# Patient Record
Sex: MALE | Race: WHITE | Employment: OTHER | URBAN - METROPOLITAN AREA
[De-identification: names, ages, dates, MRNs, and addresses within clinical notes are randomized per-mention and may not be internally consistent; named-entity substitution may affect disease eponyms.]

---

## 2018-03-06 ENCOUNTER — HOSPITAL ENCOUNTER (OUTPATIENT)
Dept: PREADMISSION TESTING | Age: 63
Discharge: HOME | End: 2018-03-06
Payer: COMMERCIAL

## 2018-03-06 PROBLEM — M10.9 GOUT: Chronic | Status: ACTIVE | Noted: 2018-03-06

## 2018-03-06 PROBLEM — S83.242A TEAR OF MEDIAL MENISCUS OF LEFT KNEE, CURRENT: Chronic | Status: ACTIVE | Noted: 2018-03-06

## 2018-03-06 PROBLEM — K21.9 GERD (GASTROESOPHAGEAL REFLUX DISEASE): Chronic | Status: ACTIVE | Noted: 2018-03-06

## 2018-03-06 LAB
ALBUMIN SERPL-MCNC: 3.6 G/DL (ref 3.4–5)
ALBUMIN/GLOB SERPL: 0.8 {RATIO} (ref 0.8–1.7)
ALP SERPL-CCNC: 65 U/L (ref 45–117)
ALT SERPL-CCNC: 66 U/L (ref 16–61)
ANION GAP SERPL CALC-SCNC: 8 MMOL/L (ref 3–18)
AST SERPL-CCNC: 39 U/L (ref 15–37)
BILIRUB SERPL-MCNC: 0.3 MG/DL (ref 0.2–1)
BUN SERPL-MCNC: 22 MG/DL (ref 7–18)
BUN/CREAT SERPL: 18 (ref 12–20)
CALCIUM SERPL-MCNC: 9.2 MG/DL (ref 8.5–10.1)
CHLORIDE SERPL-SCNC: 107 MMOL/L (ref 100–108)
CO2 SERPL-SCNC: 26 MMOL/L (ref 21–32)
CREAT SERPL-MCNC: 1.25 MG/DL (ref 0.6–1.3)
ERYTHROCYTE [DISTWIDTH] IN BLOOD BY AUTOMATED COUNT: 12.9 % (ref 11.6–14.5)
GLOBULIN SER CALC-MCNC: 4.4 G/DL (ref 2–4)
GLUCOSE SERPL-MCNC: 112 MG/DL (ref 74–99)
HCT VFR BLD AUTO: 45.3 % (ref 36–48)
HGB BLD-MCNC: 15.2 G/DL (ref 13–16)
MCH RBC QN AUTO: 31 PG (ref 24–34)
MCHC RBC AUTO-ENTMCNC: 33.6 G/DL (ref 31–37)
MCV RBC AUTO: 92.3 FL (ref 74–97)
PLATELET # BLD AUTO: 220 K/UL (ref 135–420)
PMV BLD AUTO: 9.8 FL (ref 9.2–11.8)
POTASSIUM SERPL-SCNC: 4 MMOL/L (ref 3.5–5.5)
PROT SERPL-MCNC: 8 G/DL (ref 6.4–8.2)
RBC # BLD AUTO: 4.91 M/UL (ref 4.7–5.5)
SODIUM SERPL-SCNC: 141 MMOL/L (ref 136–145)
WBC # BLD AUTO: 7.1 K/UL (ref 4.6–13.2)

## 2018-03-06 PROCEDURE — 85027 COMPLETE CBC AUTOMATED: CPT | Performed by: ORTHOPAEDIC SURGERY

## 2018-03-06 PROCEDURE — 36415 COLL VENOUS BLD VENIPUNCTURE: CPT | Performed by: ORTHOPAEDIC SURGERY

## 2018-03-06 PROCEDURE — 80053 COMPREHEN METABOLIC PANEL: CPT | Performed by: ORTHOPAEDIC SURGERY

## 2018-03-07 ENCOUNTER — HOSPITAL ENCOUNTER (OUTPATIENT)
Dept: PREADMISSION TESTING | Age: 63
Discharge: HOME | End: 2018-03-07
Payer: COMMERCIAL

## 2018-03-07 DIAGNOSIS — Z01.818 PREOP EXAMINATION: ICD-10-CM

## 2018-03-07 PROCEDURE — 93005 ELECTROCARDIOGRAM TRACING: CPT

## 2018-03-07 RX ORDER — SODIUM CHLORIDE 0.9 % (FLUSH) 0.9 %
5-10 SYRINGE (ML) INJECTION EVERY 8 HOURS
Status: CANCELLED | OUTPATIENT
Start: 2018-03-07

## 2018-03-07 RX ORDER — SODIUM CHLORIDE 0.9 % (FLUSH) 0.9 %
5-10 SYRINGE (ML) INJECTION AS NEEDED
Status: CANCELLED | OUTPATIENT
Start: 2018-03-07

## 2018-03-07 RX ORDER — DIPHENHYDRAMINE HCL 25 MG
25 CAPSULE ORAL
Status: CANCELLED | OUTPATIENT
Start: 2018-03-07

## 2018-03-07 NOTE — H&P
History and Physical        Patient: Fanny Awan               Sex: male          DOA: (Not on file)         YOB: 1955      Age:  65 y.o.        LOS:  LOS: 0 days        HPI:      Linda Sampson is in for followup of his left knee large medial meniscal tear as seen by MRI. His knee is still painful just on the medial side and he is having continued pain. His discomfort is more posterior than anterior. The patient started getting pain in the medial aspect of his left knee with a fullness in the popliteal fossa one month ago with no known injury or event. His pain has not gotten better recently and he had a popping sensation in the knee that occurred within the last week. He has noticed some more swelling. Standing AP, tunnel, lateral, and sunrise views of the left knee were obtained and interpreted in the office  and show mild DJD. Otherwise, x-rays reveal no periosteal reaction, no medullary lesions, no osteopenia, well-aligned joint spaces, and no chondrolysis.     Past Medical History:   Diagnosis Date    Acid reflux     Hyperlipidemia     Hypertension     poorly controlled    Inappropriate sinus tachycardia 12/23/2012    Low back pain     Plantar fasciitis     Sinus tachycardia     inappropriate; palpitations due to this condition       Past Surgical History:   Procedure Laterality Date    ENDOSCOPY, COLON, DIAGNOSTIC  05/18/04    HX GI  05/12/04    upper gastrointestinal panendoscopy       Family History   Problem Relation Age of Onset    Coronary Artery Disease Father     Coronary Artery Disease Mother     Coronary Artery Disease Brother     Coronary Artery Disease       2 nephews       Social History     Social History    Marital status:      Spouse name: N/A    Number of children: N/A    Years of education: N/A     Social History Main Topics    Smoking status: Never Smoker    Smokeless tobacco: Never Used    Alcohol use No    Drug use: No    Sexual activity: Not on file     Other Topics Concern    Not on file     Social History Narrative    No narrative on file       Prior to Admission medications    Medication Sig Start Date End Date Taking? Authorizing Provider   atenolol (TENORMIN) 50 mg tablet Take 1 tablet by mouth two (2) times a day. 11/4/14   Tim Bocanegra MD   multivitamin (ONE A DAY) tablet Take 1 Tab by mouth daily. Historical Provider   omega 3-dha-epa-fish oil (FISH OIL) 900-1,400 mg CpDR Take  by mouth. Historical Provider   omeprazole (PRILOSEC) 20 mg capsule Take 20 mg by mouth as needed. Historical Provider   aspirin 81 mg tablet Take  by mouth daily. Historical Provider       No Known Allergies    Review of Systems    Pertinent positives include gout. Pertinent negatives include blurred vision, chest pain, chills, cold, discharge of the eyes, dizziness, double vision, fever, headache, hearing loss, heart murmur, itching of the eyes, palpitations, redness of the eyes, rheumatic fever, ringing in ears, sore throat/hoarseness, weight change, abdominal pain, anxiety, bipolar disorder, bladder/kidney infection, bloody stool, blood in urine, burning sensation, changes in mood, chronic cough, depression, diarrhea, difficulty breathing, difficulty swallowing, fainting, frequent urinating, fracture/dislocation, gas/bloating, hemorrhoids, incontinence, joint pain, joint stiffness, loss of balance, memory loss, muscle weakness, nausea/vomiting, numbness/tingling, pain on breathing, painful urination, psoriasis, rash/itching, Raynaud's phenomenon, rheumatoid disease, seizure disorder, shortness of breath, sprain/strain, swelling of feet, tendonitis, varicose veins and wheezing. Physical Exam:      There were no vitals taken for this visit. Physical Exam:  Physical exam shows a healthy appearing 65year old white male. The left knee is tender on the posteromedial joint line.   He has about a 20cc knee effusion and he has only mild patellofemoral crepitation. Examination of the left knee demonstrates the patient has full extension to flexion well beyond 90 degrees. The patient has a negative Lachman and has no varus or valgus instability at zero degrees or 30 degrees. There is a negative posterior sag. There is no swelling, ecchymosis, or wounds. The patient has no lateral joint line pain and no popliteal pain. The patient has a negative patellar inhibition, a negative patellar grind, and a negative patellar apprehension test.  There is a negative Manny Maneuver. There is no pain at the inferior pole of the patella or the tibial tubercle. The patient has 5/5 muscle strength with good quadriceps tone. The patient has good capillary refill with normal motor strength of the foot and ankle and normal light-touch sensation in the foot and lower leg. Assessment/Plan     Principal Problem:    Tear of medial meniscus of left knee, current (3/6/2018)    Active Problems:    GERD (gastroesophageal reflux disease) (3/6/2018)      Gout (3/6/2018)      Dr. James Miranda talked to him about a left knee arthroscopy and partial medial meniscectomy under MAC anesthesia. Dr. Hernandez President talked to the patient about the risks, the alternatives, and the benefits including infection, pain and bleeding, and he understands these and is willing to proceed. 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 Dr. James Miranda has given him Norco for postoperative pain use and Dr. James Miranda will see him again one week postop.

## 2018-03-08 ENCOUNTER — HOSPITAL ENCOUNTER (OUTPATIENT)
Age: 63
Setting detail: OUTPATIENT SURGERY
Discharge: HOME | End: 2018-03-08
Attending: ORTHOPAEDIC SURGERY | Admitting: ORTHOPAEDIC SURGERY
Payer: COMMERCIAL

## 2018-03-08 ENCOUNTER — ANESTHESIA EVENT (OUTPATIENT)
Dept: SURGERY | Age: 63
End: 2018-03-08
Payer: COMMERCIAL

## 2018-03-08 ENCOUNTER — ANESTHESIA (OUTPATIENT)
Dept: SURGERY | Age: 63
End: 2018-03-08
Payer: COMMERCIAL

## 2018-03-08 VITALS
OXYGEN SATURATION: 98 % | TEMPERATURE: 98.2 F | HEART RATE: 91 BPM | HEIGHT: 71 IN | BODY MASS INDEX: 37.81 KG/M2 | WEIGHT: 270.06 LBS | SYSTOLIC BLOOD PRESSURE: 141 MMHG | RESPIRATION RATE: 15 BRPM | DIASTOLIC BLOOD PRESSURE: 79 MMHG

## 2018-03-08 PROBLEM — S83.249A MEDIAL MENISCUS TEAR: Status: ACTIVE | Noted: 2018-03-08

## 2018-03-08 LAB
ATRIAL RATE: 77 BPM
CALCULATED P AXIS, ECG09: 38 DEGREES
CALCULATED R AXIS, ECG10: 28 DEGREES
CALCULATED T AXIS, ECG11: 26 DEGREES
DIAGNOSIS, 93000: NORMAL
P-R INTERVAL, ECG05: 176 MS
Q-T INTERVAL, ECG07: 380 MS
QRS DURATION, ECG06: 82 MS
QTC CALCULATION (BEZET), ECG08: 430 MS
VENTRICULAR RATE, ECG03: 77 BPM

## 2018-03-08 PROCEDURE — 77030006884 HC BLD SHV INCIS S&N -B: Performed by: ORTHOPAEDIC SURGERY

## 2018-03-08 PROCEDURE — 76210000063 HC OR PH I REC FIRST 0.5 HR: Performed by: ORTHOPAEDIC SURGERY

## 2018-03-08 PROCEDURE — 74011250636 HC RX REV CODE- 250/636

## 2018-03-08 PROCEDURE — 77030036563 HC WRP CLD THER KNE S2SG -B: Performed by: ORTHOPAEDIC SURGERY

## 2018-03-08 PROCEDURE — 77030020782 HC GWN BAIR PAWS FLX 3M -B: Performed by: ORTHOPAEDIC SURGERY

## 2018-03-08 PROCEDURE — 76060000032 HC ANESTHESIA 0.5 TO 1 HR: Performed by: ORTHOPAEDIC SURGERY

## 2018-03-08 PROCEDURE — 74011250636 HC RX REV CODE- 250/636: Performed by: ANESTHESIOLOGY

## 2018-03-08 PROCEDURE — 77030028224 HC PDNG CST BSNM -A: Performed by: ORTHOPAEDIC SURGERY

## 2018-03-08 PROCEDURE — 77030034478 HC TU IRR ARTHRO PT ARTH -B: Performed by: ORTHOPAEDIC SURGERY

## 2018-03-08 PROCEDURE — 77030008509 HC TBNG IN/OUT FLO LEMV -B: Performed by: ORTHOPAEDIC SURGERY

## 2018-03-08 PROCEDURE — 74011000250 HC RX REV CODE- 250: Performed by: ORTHOPAEDIC SURGERY

## 2018-03-08 PROCEDURE — 74011250636 HC RX REV CODE- 250/636: Performed by: ORTHOPAEDIC SURGERY

## 2018-03-08 PROCEDURE — 77030018835 HC SOL IRR LR ICUM -A: Performed by: ORTHOPAEDIC SURGERY

## 2018-03-08 PROCEDURE — 76210000021 HC REC RM PH II 0.5 TO 1 HR: Performed by: ORTHOPAEDIC SURGERY

## 2018-03-08 PROCEDURE — 76010000138 HC OR TIME 0.5 TO 1 HR: Performed by: ORTHOPAEDIC SURGERY

## 2018-03-08 PROCEDURE — 74011000250 HC RX REV CODE- 250

## 2018-03-08 PROCEDURE — 77030002933 HC SUT MCRYL J&J -A: Performed by: ORTHOPAEDIC SURGERY

## 2018-03-08 PROCEDURE — 74011250636 HC RX REV CODE- 250/636: Performed by: PHYSICIAN ASSISTANT

## 2018-03-08 RX ORDER — KETOROLAC TROMETHAMINE 30 MG/ML
INJECTION, SOLUTION INTRAMUSCULAR; INTRAVENOUS AS NEEDED
Status: DISCONTINUED | OUTPATIENT
Start: 2018-03-08 | End: 2018-03-08 | Stop reason: HOSPADM

## 2018-03-08 RX ORDER — DEXTROSE 50 % IN WATER (D50W) INTRAVENOUS SYRINGE
25-50 AS NEEDED
Status: DISCONTINUED | OUTPATIENT
Start: 2018-03-08 | End: 2018-03-08 | Stop reason: HOSPADM

## 2018-03-08 RX ORDER — HYDROCODONE BITARTRATE AND ACETAMINOPHEN 5; 325 MG/1; MG/1
1 TABLET ORAL AS NEEDED
Status: DISCONTINUED | OUTPATIENT
Start: 2018-03-08 | End: 2018-03-08 | Stop reason: HOSPADM

## 2018-03-08 RX ORDER — DEXAMETHASONE SODIUM PHOSPHATE 4 MG/ML
INJECTION, SOLUTION INTRA-ARTICULAR; INTRALESIONAL; INTRAMUSCULAR; INTRAVENOUS; SOFT TISSUE AS NEEDED
Status: DISCONTINUED | OUTPATIENT
Start: 2018-03-08 | End: 2018-03-08 | Stop reason: HOSPADM

## 2018-03-08 RX ORDER — DIPHENHYDRAMINE HYDROCHLORIDE 50 MG/ML
12.5 INJECTION, SOLUTION INTRAMUSCULAR; INTRAVENOUS
Status: DISCONTINUED | OUTPATIENT
Start: 2018-03-08 | End: 2018-03-08 | Stop reason: HOSPADM

## 2018-03-08 RX ORDER — MIDAZOLAM HYDROCHLORIDE 1 MG/ML
INJECTION, SOLUTION INTRAMUSCULAR; INTRAVENOUS AS NEEDED
Status: DISCONTINUED | OUTPATIENT
Start: 2018-03-08 | End: 2018-03-08 | Stop reason: HOSPADM

## 2018-03-08 RX ORDER — LIDOCAINE HYDROCHLORIDE 20 MG/ML
INJECTION, SOLUTION EPIDURAL; INFILTRATION; INTRACAUDAL; PERINEURAL AS NEEDED
Status: DISCONTINUED | OUTPATIENT
Start: 2018-03-08 | End: 2018-03-08 | Stop reason: HOSPADM

## 2018-03-08 RX ORDER — SODIUM CHLORIDE, SODIUM LACTATE, POTASSIUM CHLORIDE, CALCIUM CHLORIDE 600; 310; 30; 20 MG/100ML; MG/100ML; MG/100ML; MG/100ML
150 INJECTION, SOLUTION INTRAVENOUS CONTINUOUS
Status: DISCONTINUED | OUTPATIENT
Start: 2018-03-08 | End: 2018-03-08 | Stop reason: HOSPADM

## 2018-03-08 RX ORDER — GLYCOPYRROLATE 0.2 MG/ML
INJECTION INTRAMUSCULAR; INTRAVENOUS AS NEEDED
Status: DISCONTINUED | OUTPATIENT
Start: 2018-03-08 | End: 2018-03-08 | Stop reason: HOSPADM

## 2018-03-08 RX ORDER — ONDANSETRON 2 MG/ML
INJECTION INTRAMUSCULAR; INTRAVENOUS AS NEEDED
Status: DISCONTINUED | OUTPATIENT
Start: 2018-03-08 | End: 2018-03-08 | Stop reason: HOSPADM

## 2018-03-08 RX ORDER — SODIUM CHLORIDE, SODIUM LACTATE, POTASSIUM CHLORIDE, CALCIUM CHLORIDE 600; 310; 30; 20 MG/100ML; MG/100ML; MG/100ML; MG/100ML
125 INJECTION, SOLUTION INTRAVENOUS CONTINUOUS
Status: DISCONTINUED | OUTPATIENT
Start: 2018-03-08 | End: 2018-03-08 | Stop reason: HOSPADM

## 2018-03-08 RX ORDER — MAGNESIUM SULFATE 100 %
4 CRYSTALS MISCELLANEOUS AS NEEDED
Status: DISCONTINUED | OUTPATIENT
Start: 2018-03-08 | End: 2018-03-08 | Stop reason: HOSPADM

## 2018-03-08 RX ORDER — ONDANSETRON 2 MG/ML
4 INJECTION INTRAMUSCULAR; INTRAVENOUS ONCE
Status: DISCONTINUED | OUTPATIENT
Start: 2018-03-08 | End: 2018-03-08 | Stop reason: HOSPADM

## 2018-03-08 RX ORDER — FENTANYL CITRATE 50 UG/ML
INJECTION, SOLUTION INTRAMUSCULAR; INTRAVENOUS AS NEEDED
Status: DISCONTINUED | OUTPATIENT
Start: 2018-03-08 | End: 2018-03-08 | Stop reason: HOSPADM

## 2018-03-08 RX ORDER — NALOXONE HYDROCHLORIDE 0.4 MG/ML
0.1 INJECTION, SOLUTION INTRAMUSCULAR; INTRAVENOUS; SUBCUTANEOUS AS NEEDED
Status: DISCONTINUED | OUTPATIENT
Start: 2018-03-08 | End: 2018-03-08 | Stop reason: HOSPADM

## 2018-03-08 RX ORDER — SODIUM CHLORIDE 0.9 % (FLUSH) 0.9 %
5-10 SYRINGE (ML) INJECTION AS NEEDED
Status: DISCONTINUED | OUTPATIENT
Start: 2018-03-08 | End: 2018-03-08 | Stop reason: HOSPADM

## 2018-03-08 RX ORDER — INSULIN LISPRO 100 [IU]/ML
INJECTION, SOLUTION INTRAVENOUS; SUBCUTANEOUS ONCE
Status: DISCONTINUED | OUTPATIENT
Start: 2018-03-08 | End: 2018-03-08 | Stop reason: HOSPADM

## 2018-03-08 RX ORDER — PROPOFOL 10 MG/ML
INJECTION, EMULSION INTRAVENOUS AS NEEDED
Status: DISCONTINUED | OUTPATIENT
Start: 2018-03-08 | End: 2018-03-08 | Stop reason: HOSPADM

## 2018-03-08 RX ORDER — ALBUTEROL SULFATE 0.83 MG/ML
2.5 SOLUTION RESPIRATORY (INHALATION) AS NEEDED
Status: DISCONTINUED | OUTPATIENT
Start: 2018-03-08 | End: 2018-03-08 | Stop reason: HOSPADM

## 2018-03-08 RX ORDER — HYDROMORPHONE HYDROCHLORIDE 2 MG/ML
0.5 INJECTION, SOLUTION INTRAMUSCULAR; INTRAVENOUS; SUBCUTANEOUS
Status: DISCONTINUED | OUTPATIENT
Start: 2018-03-08 | End: 2018-03-08 | Stop reason: HOSPADM

## 2018-03-08 RX ADMIN — SODIUM CHLORIDE, SODIUM LACTATE, POTASSIUM CHLORIDE, AND CALCIUM CHLORIDE 125 ML/HR: 600; 310; 30; 20 INJECTION, SOLUTION INTRAVENOUS at 10:51

## 2018-03-08 RX ADMIN — PROPOFOL 200 MG: 10 INJECTION, EMULSION INTRAVENOUS at 13:35

## 2018-03-08 RX ADMIN — ONDANSETRON 4 MG: 2 INJECTION INTRAMUSCULAR; INTRAVENOUS at 13:23

## 2018-03-08 RX ADMIN — PROPOFOL 50 MG: 10 INJECTION, EMULSION INTRAVENOUS at 13:44

## 2018-03-08 RX ADMIN — MIDAZOLAM HYDROCHLORIDE 2 MG: 1 INJECTION, SOLUTION INTRAMUSCULAR; INTRAVENOUS at 13:12

## 2018-03-08 RX ADMIN — SODIUM CHLORIDE, SODIUM LACTATE, POTASSIUM CHLORIDE, AND CALCIUM CHLORIDE 150 ML/HR: 600; 310; 30; 20 INJECTION, SOLUTION INTRAVENOUS at 14:10

## 2018-03-08 RX ADMIN — KETOROLAC TROMETHAMINE 30 MG: 30 INJECTION, SOLUTION INTRAMUSCULAR; INTRAVENOUS at 13:23

## 2018-03-08 RX ADMIN — DEXAMETHASONE SODIUM PHOSPHATE 4 MG: 4 INJECTION, SOLUTION INTRA-ARTICULAR; INTRALESIONAL; INTRAMUSCULAR; INTRAVENOUS; SOFT TISSUE at 13:23

## 2018-03-08 RX ADMIN — GLYCOPYRROLATE 0.2 MG: 0.2 INJECTION INTRAMUSCULAR; INTRAVENOUS at 13:12

## 2018-03-08 RX ADMIN — LIDOCAINE HYDROCHLORIDE 80 MG: 20 INJECTION, SOLUTION EPIDURAL; INFILTRATION; INTRACAUDAL; PERINEURAL at 13:35

## 2018-03-08 RX ADMIN — FENTANYL CITRATE 100 MCG: 50 INJECTION, SOLUTION INTRAMUSCULAR; INTRAVENOUS at 13:35

## 2018-03-08 NOTE — DISCHARGE INSTRUCTIONS
Orest Caves, III, MD Nolon Brittle, PA-C    Lower Extremity Surgery  Discharge Instructions      Please take the time to review the following instructions before you leave the hospital and use them as guidelines during your recovery from surgery. If you have any questions you may contact my office at [phone]. Wound Care/Dressing Changes:    []   Keep the surgical dressing on for two days from the day of your surgery. Once you remove this, no dressing is necessary if there is no drainage. [x]   You may change your dressing as needed. Beginning the 2 days after you are discharged from the hospital you can change your dressing daily. A big, bulky dressing isn't necessary as long as there isn't any drainage from the incisions. You can put a band-aid or a pice of gauze over each incision and wear an ACE bandage as needed for comfort and swelling. []   Don't remove your dressing or get them wet. · It isn't necessary to apply antibiotic ointment to your incisions. Sutures will be removed at your one week post-op visit. Staples (if you have them) are removed in two weeks. If you have steri-strips over your incision they will start to peel off in 7-10 days as you get them wet. They don't need to be removed prior to that. When they begin to peel off you can remove them. They should all be removed by 14 days from your surgery. If your wound is closed with Dermabond nothing has to be done or removed. Showering/Bathing:    [x]   You may shower 2 days after surgery. Your dressing may be removed for showering. You may get your incisions wet in the shower. Don't vigorously scrub the area where your incisions are. Apply a clean, dry dressing after drying off the area of your incisions. Don't take a tub bath, get in a swimming pool or Jacuzzi until the incisions are completely healed. Do not soak your incisions under water. []   Do not get the dressing wet.   Once you remove it two days from surgery, you may get the incision wet if there is no drainage. Weight Bearing Status/Braces/Activity:    [x]   You may walk as tolerated and perform your normal daily activities. Use crutches, a walker, or a cane only if you need them. You should strive to achieve full range of motion in your knee as soon as possible. Please start using a stationary bike or walking for exercise 3 or 4 days after surgery. We would like for you to return to your normal activities as soon as possible. If you feel comfortable returning to work, you may do so at any time.    []   Weight bearing as tolerated with the knee immobilizer in place. Use crutches, cane, or walker as needed. You should sleep in your knee immobilizer. []   Non-weight bearing. Please do not bear any weight on your leg. You may use your toes for balance when walking with a cane or crutches. Ice/Elevation:    Continue ice and elevation consistently for 48 hours after surgery. When elevating your knee elevate it on about 4 pillows to be sure it is above your heart. After 48 hours, you should ice your knee 3 times per day, for 20 minutes at a time for the next 5 days. After one week from surgery, you may use ice and elevation as needed for pain and swelling. Diet:    You may advance to your regular diet as tolerated. Medication:    1. You will be given a prescription for pain medications when you are discharged from the hospital.  Take the medication as needed according to the directions on the prescription bottle. Possible side effects of the medication include dizziness, headache, nausea, vomiting, constipation and urinary retention. If you experience any of these side effects call the office so that we can assist you in relieving them. Discontinue the use of the pain medication if you develop itching, rash, shortness of breath or difficulties swallowing.   If these symptoms become severe or aren't relieved by discontinuing the medication you should seek immediate medical attention. Refills of pain medication are authorized during office hours only (8AM - 5PM Mon thru Fri). 2. If you were prescribed Percocet/oxycodone or Dilaudid/hydromorphone you must have a written prescription. These medications legally cannot be called in to a pharmacy. 3. You may take over the counter Ibuprofen/Advil/Aleve between dosages of your pain medication if needed. Do not take Tylenol in addition to your pain medication as most of the pain medication already contains Tylenol. Exceptions include Dilaudid/hydromorphone, Demerol/meperidine and roxicodone. Do not exceed 3000 mg of Tylenol per day. Ex:  (hydrocodon 5/325mg = 325 mg of Tylenol)   4. If you have had a joint replacement you should take Xarelto 10 mg daily for 28 days from the date of your surgery. This will help to prevent blood clots from forming in your legs. 5. You may resume the medication you were taking prior to your surgery. Pain medication may change the effects of any antidepressant medication. If you have any questions about possible interactions between your regular medications and the pain medication you should consult the physician who prescribes your regular medications. Follow Up Appointment:    If you are unsure of your follow-up appointment date and time, please call [phone]. Please let our  know you are scheduling a post-op appointment. Most appointments should be between 7-14 days after your surgery. Physical Therapy:    []   Physical therapy will be discussed with you at your first follow-up appointment with Dr. Armando Horowitz. You don't need to begin physical therapy prior to that visit. You are to participate with [address] [place] [address] as arranged pre-operatively in the convience of your own home. [x]   Physical therapy will be discussed with you at your first follow-up appointment with Dr. Armando Horowitz.   You don't need to begin physical therapy prior to that visit. []   If you already have a therapy appointment, please be sure to attend your sessions as scheduled. If you do not have physical therapy scheduled, please call Dr. Bola Hester' office to set up your first appointment as soon as possible.    []   You do not require Physical Therapy. Important signs and symptoms:    If any of the following signs and symptoms occurs, you should contact Dr. Bola Hester' office. Please be advised if a problem arises which you feel required immediate medical attention or your are unable to contact Dr. Bola Hester' office you should seek immediate medical attention. Signs and symptoms to watch for include:  1. A sudden increase in swelling and/or redness or warmth at the area your surgery was performed which isn't relieved by rest, ice and elevation. 2. Oral temperature greater than 101.5 degrees for 12 hours or more which isn't relieved by an increase in fluid intake and taking two Tylenol every 4-6 hours. Do not exceed 3000 mg of Tylenol per day. 3. Excessive drainage from your incisions or drainage that hasn't stopped by 72 hours. 4. Calf pian, tenderness, redness or swelling which isn't relieved with rest and elevation. 5. Fever, chills, shortness of breath, chest pain, nausea, vomiting or other signs and symptoms which are of concern to you. DISCHARGE SUMMARY from Nurse    PATIENT INSTRUCTIONS:    After general anesthesia or intravenous sedation, for 24 hours or while taking prescription Narcotics:  · Limit your activities  · Do not drive and operate hazardous machinery  · Do not make important personal or business decisions  · Do  not drink alcoholic beverages  · If you have not urinated within 8 hours after discharge, please contact your surgeon on call.     Report the following to your surgeon:  · Excessive pain, swelling, redness or odor of or around the surgical area  · Temperature over 100.5  · Nausea and vomiting lasting longer than 4 hours or if unable to take medications  · Any signs of decreased circulation or nerve impairment to extremity: change in color, persistent  numbness, tingling, coldness or increase pain  · Any questions    What to do at Home:  Recommended activity: Activity as tolerated and Ambulate in house. If you experience any of the following symptoms as listed above, please follow up with Dr. Giulia Espinosa. *  Please give a list of your current medications to your Primary Care Provider. *  Please update this list whenever your medications are discontinued, doses are      changed, or new medications (including over-the-counter products) are added. *  Please carry medication information at all times in case of emergency situations. These are general instructions for a healthy lifestyle:    No smoking/ No tobacco products/ Avoid exposure to second hand smoke  Surgeon General's Warning:  Quitting smoking now greatly reduces serious risk to your health. Obesity, smoking, and sedentary lifestyle greatly increases your risk for illness    A healthy diet, regular physical exercise & weight monitoring are important for maintaining a healthy lifestyle    You may be retaining fluid if you have a history of heart failure or if you experience any of the following symptoms:  Weight gain of 3 pounds or more overnight or 5 pounds in a week, increased swelling in our hands or feet or shortness of breath while lying flat in bed. Please call your doctor as soon as you notice any of these symptoms; do not wait until your next office visit. Recognize signs and symptoms of STROKE:    F-face looks uneven    A-arms unable to move or move unevenly    S-speech slurred or non-existent    T-time-call 911 as soon as signs and symptoms begin-DO NOT go       Back to bed or wait to see if you get better-TIME IS BRAIN. Warning Signs of HEART ATTACK     Call 911 if you have these symptoms:   Chest discomfort.  Most heart attacks involve discomfort in the center of the chest that lasts more than a few minutes, or that goes away and comes back. It can feel like uncomfortable pressure, squeezing, fullness, or pain.  Discomfort in other areas of the upper body. Symptoms can include pain or discomfort in one or both arms, the back, neck, jaw, or stomach.  Shortness of breath with or without chest discomfort.  Other signs may include breaking out in a cold sweat, nausea, or lightheadedness. Don't wait more than five minutes to call 911 - MINUTES MATTER! Fast action can save your life. Calling 911 is almost always the fastest way to get lifesaving treatment. Emergency Medical Services staff can begin treatment when they arrive -- up to an hour sooner than if someone gets to the hospital by car. The discharge information has been reviewed with the patient and caregiver. The patient and caregiver verbalized understanding. Discharge medications reviewed with the patient and caregiver and appropriate educational materials and side effects teaching were provided. Patient armband removed and shredded.     ___________________________________________________________________________________________________________________________________

## 2018-03-08 NOTE — IP AVS SNAPSHOT
Meera Matos 
 
 
 [address] [facility] [address] 
[phone] Patient: Ro Naidu MRN: [id number] SWD:[id number]/4/[birth year]00 About your hospitalization You were admitted on:  March 8, 2018 You last received care in the:  THE [facility] PACU You were discharged on:  March 8, 2018 Why you were hospitalized Your primary diagnosis was:  Tear Of Medial Meniscus Of Left Knee, Current Your diagnoses also included:  Gerd (Gastroesophageal Reflux Disease), Gout, Medial Meniscus Tear Follow-up Information Follow up With Details Comments Contact Info Maddy Bowers. [place] [phone] Fanta Walker MD  Office will call you tomorrow to schedule follow up appointment. [address] [place] [address] 
[phone] Discharge Orders None A check silver indicates which time of day the medication should be taken. My Medications CONTINUE taking these medications Instructions Each Dose to Equal  
 Morning Noon Evening Bedtime  
 aspirin 81 mg tablet Your last dose was: Your next dose is: Take  by mouth every evening. atenolol 50 mg tablet Commonly known as:  TENORMIN Your last dose was: Your next dose is: Take 50 mg by mouth nightly. Indications: rapid heart reate 50 mg  
    
   
   
   
  
 PriLOSEC 20 mg capsule Generic drug:  omeprazole Your last dose was: Your next dose is: Take 20 mg by mouth as needed. Indications: gastroesophageal reflux disease 20 mg Discharge Instructions Fanta Walker III, MD Kathlee Goods, PA-C Lower Extremity Surgery Discharge Instructions Please take the time to review the following instructions before you leave the hospital and use them as guidelines during your recovery from surgery. If you have any questions you may contact my office at [phone]. Wound Care/Dressing Changes: 
 
[]   Keep the surgical dressing on for two days from the day of your surgery. Once you remove this, no dressing is necessary if there is no drainage. [x]   You may change your dressing as needed. Beginning the 2 days after you are discharged from the hospital you can change your dressing daily. A big, bulky dressing isn't necessary as long as there isn't any drainage from the incisions. You can put a band-aid or a pice of gauze over each incision and wear an ACE bandage as needed for comfort and swelling. []   Don't remove your dressing or get them wet. · It isn't necessary to apply antibiotic ointment to your incisions. Sutures will be removed at your one week post-op visit. Staples (if you have them) are removed in two weeks. If you have steri-strips over your incision they will start to peel off in 7-10 days as you get them wet. They don't need to be removed prior to that. When they begin to peel off you can remove them. They should all be removed by 14 days from your surgery. If your wound is closed with Dermabond nothing has to be done or removed. Showering/Bathing: 
 
[x]   You may shower 2 days after surgery. Your dressing may be removed for showering. You may get your incisions wet in the shower. Don't vigorously scrub the area where your incisions are. Apply a clean, dry dressing after drying off the area of your incisions. Don't take a tub bath, get in a swimming pool or Jacuzzi until the incisions are completely healed. Do not soak your incisions under water. []   Do not get the dressing wet. Once you remove it two days from surgery, you may get the incision wet if there is no drainage. Weight Bearing Status/Braces/Activity: 
 
[x]   You may walk as tolerated and perform your normal daily activities. Use crutches, a walker, or a cane only if you need them. You should strive to achieve full range of motion in your knee as soon as possible. Please start using a stationary bike or walking for exercise 3 or 4 days after surgery. We would like for you to return to your normal activities as soon as possible. If you feel comfortable returning to work, you may do so at any time. 
 
[]   Weight bearing as tolerated with the knee immobilizer in place. Use crutches, cane, or walker as needed. You should sleep in your knee immobilizer. []   Non-weight bearing. Please do not bear any weight on your leg. You may use your toes for balance when walking with a cane or crutches. Ice/Elevation: 
 
Continue ice and elevation consistently for 48 hours after surgery. When elevating your knee elevate it on about 4 pillows to be sure it is above your heart. After 48 hours, you should ice your knee 3 times per day, for 20 minutes at a time for the next 5 days. After one week from surgery, you may use ice and elevation as needed for pain and swelling. Diet: 
 
You may advance to your regular diet as tolerated. Medication: 
 
1. You will be given a prescription for pain medications when you are discharged from the hospital.  Take the medication as needed according to the directions on the prescription bottle. Possible side effects of the medication include dizziness, headache, nausea, vomiting, constipation and urinary retention. If you experience any of these side effects call the office so that we can assist you in relieving them. Discontinue the use of the pain medication if you develop itching, rash, shortness of breath or difficulties swallowing. If these symptoms become severe or aren't relieved by discontinuing the medication you should seek immediate medical attention. Refills of pain medication are authorized during office hours only (8AM - 5PM Mon thru Fri). 2. If you were prescribed Percocet/oxycodone or Dilaudid/hydromorphone you must have a written prescription. These medications legally cannot be called in to a pharmacy. 3. You may take over the counter Ibuprofen/Advil/Aleve between dosages of your pain medication if needed. Do not take Tylenol in addition to your pain medication as most of the pain medication already contains Tylenol. Exceptions include Dilaudid/hydromorphone, Demerol/meperidine and roxicodone. Do not exceed 3000 mg of Tylenol per day. Ex:  (hydrocodon 5/325mg = 325 mg of Tylenol) 4. If you have had a joint replacement you should take Xarelto 10 mg daily for 28 days from the date of your surgery. This will help to prevent blood clots from forming in your legs. 5. You may resume the medication you were taking prior to your surgery. Pain medication may change the effects of any antidepressant medication. If you have any questions about possible interactions between your regular medications and the pain medication you should consult the physician who prescribes your regular medications. Follow Up Appointment: If you are unsure of your follow-up appointment date and time, please call [phone]. Please let our  know you are scheduling a post-op appointment. Most appointments should be between 7-14 days after your surgery. Physical Therapy: 
 
[]   Physical therapy will be discussed with you at your first follow-up appointment with Dr. Niyah Beaver. You don't need to begin physical therapy prior to that visit. You are to participate with [address] [place] [address] as arranged pre-operatively in the convience of your own home. [x]   Physical therapy will be discussed with you at your first follow-up appointment with Dr. Niyah Beaver. You don't need to begin physical therapy prior to that visit.   
 
[]   If you already have a therapy appointment, please be sure to attend your sessions as scheduled. If you do not have physical therapy scheduled, please call Dr. Azeb Reed' office to set up your first appointment as soon as possible. 
 
[]   You do not require Physical Therapy. Important signs and symptoms: 
 
If any of the following signs and symptoms occurs, you should contact Dr. Azeb Reed' office. Please be advised if a problem arises which you feel required immediate medical attention or your are unable to contact Dr. Aezb Reed' office you should seek immediate medical attention. Signs and symptoms to watch for include: 1. A sudden increase in swelling and/or redness or warmth at the area your surgery was performed which isn't relieved by rest, ice and elevation. 2. Oral temperature greater than 101.5 degrees for 12 hours or more which isn't relieved by an increase in fluid intake and taking two Tylenol every 4-6 hours. Do not exceed 3000 mg of Tylenol per day. 3. Excessive drainage from your incisions or drainage that hasn't stopped by 72 hours. 4. Calf pian, tenderness, redness or swelling which isn't relieved with rest and elevation. 5. Fever, chills, shortness of breath, chest pain, nausea, vomiting or other signs and symptoms which are of concern to you. DISCHARGE SUMMARY from Nurse PATIENT INSTRUCTIONS: 
 
 
F-face looks uneven A-arms unable to move or move unevenly S-speech slurred or non-existent T-time-call 911 as soon as signs and symptoms begin-DO NOT go Back to bed or wait to see if you get better-TIME IS BRAIN. Warning Signs of HEART ATTACK Call 911 if you have these symptoms: 
? Chest discomfort.  Most heart attacks involve discomfort in the center of the chest that lasts more than a few minutes, or that goes away and comes back. It can feel like uncomfortable pressure, squeezing, fullness, or pain. ? Discomfort in other areas of the upper body. Symptoms can include pain or discomfort in one or both arms, the back, neck, jaw, or stomach. ? Shortness of breath with or without chest discomfort. ? Other signs may include breaking out in a cold sweat, nausea, or lightheadedness. Don't wait more than five minutes to call [address]! Fast action can save your life. Calling 911 is almost always the fastest way to get lifesaving treatment. Emergency Medical Services staff can begin treatment when they arrive  up to an hour sooner than if someone gets to the hospital by car. The discharge information has been reviewed with the patient and caregiver. The patient and caregiver verbalized understanding. Discharge medications reviewed with the patient and caregiver and appropriate educational materials and side effects teaching were provided. Patient armband removed and shredded. ___________________________________________________________________________________________________________________________________ Introducing [facility]! Dear Latricia Mobley: Thank you for requesting a [employer] account. Our records indicate that you already have an active [employer] account. You can access your account anytime at [email or web address] Did you know that you can access your hospital and ER discharge instructions at any time in [employer]? You can also review all of your test results from your hospital stay or ER visit. Additional Information If you have questions, please visit the Frequently Asked Questions section of the [employer] website at [email or web address] Remember, [employer] is NOT to be used for urgent needs. For medical emergencies, dial 911. Now available from your iPhone and Android! Providers Seen During Your Hospitalization Provider Specialty Primary office phone Tara Lantigua, [facility] [phone] Your Primary Care Physician (PCP) Primary Care Physician Office Phone Office Fax Stephon Salcido [phone] You are allergic to the following No active allergies Recent Documentation Height Weight BMI Smoking Status 1.803 m 122.5 kg 37.67 kg/m2 Never Smoker Emergency Contacts Name Discharge Info Relation Home Work Mobile [employer] DISCHARGE CAREGIVER [3] Spouse [3] [phone] Patient Belongings The following personal items are in your possession at time of discharge: 
  Dental Appliances: None         Home Medications: None   Jewelry: None  Clothing: Undergarments, Socks, Footwear, Shirt, Pants (with [place])    Other Valuables: Cell Phone, Scott Epley (with [place]) Please provide this summary of care documentation to your next provider. Signatures-by signing, you are acknowledging that this After Visit Summary has been reviewed with you and you have received a copy. Patient Signature:  ____________________________________________________________ Date:  ____________________________________________________________  
  
[facility] Payor Provider Signature:  ____________________________________________________________ Date:  ____________________________________________________________

## 2018-03-08 NOTE — INTERVAL H&P NOTE
H&P Update:  Romelia Pizano was seen and examined. History and physical has been reviewed. The patient has been examined. There have been no significant clinical changes since the completion of the originally dated History and Physical.  Patient identified by surgeon; surgical site was confirmed by patient and surgeon.     Signed By: Shahida Bowden MD     March 8, 2018 12:50 PM

## 2018-03-08 NOTE — PERIOP NOTES
TRANSFER - OUT REPORT:    Verbal report given to CAREY Summers RN(name) on Gary Shoulders  being transferred to phase 2(unit) for routine progression of care       Report consisted of patients Situation, Background, Assessment and   Recommendations(SBAR). Information from the following report(s) SBAR, OR Summary, Procedure Summary, Intake/Output and MAR was reviewed with the receiving nurse. Lines:   Peripheral IV 03/08/18 Right Hand (Active)   Site Assessment Clean, dry, & intact 3/8/2018  2:00 PM   Phlebitis Assessment 0 3/8/2018  2:00 PM   Infiltration Assessment 0 3/8/2018  2:00 PM   Dressing Status Clean, dry, & intact 3/8/2018  2:00 PM   Dressing Type Transparent;Tape 3/8/2018  2:00 PM   Hub Color/Line Status Pink; Infusing;Patent 3/8/2018  2:00 PM        Opportunity for questions and clarification was provided.       Patient transported with:   Dwllr

## 2018-03-08 NOTE — PERIOP NOTES
Pt. Used restroom in pre-op area with assistance. Patient placed on Jonas Paws for a minimum of 30 min in  Preop.

## 2018-03-08 NOTE — ANESTHESIA PREPROCEDURE EVALUATION
Anesthetic History   No history of anesthetic complications            Review of Systems / Medical History  Patient summary reviewed, nursing notes reviewed and pertinent labs reviewed    Pulmonary  Within defined limits                 Neuro/Psych   Within defined limits           Cardiovascular    Hypertension        Dysrhythmias       Exercise tolerance: >4 METS     GI/Hepatic/Renal     GERD           Endo/Other  Within defined limits           Other Findings            Physical Exam    Airway  Mallampati: III  TM Distance: 4 - 6 cm  Neck ROM: normal range of motion   Mouth opening: Normal     Cardiovascular  Regular rate and rhythm,  S1 and S2 normal,  no murmur, click, rub, or gallop             Dental  No notable dental hx       Pulmonary  Breath sounds clear to auscultation               Abdominal  GI exam deferred       Other Findings            Anesthetic Plan    ASA: 2  Anesthesia type: MAC          Induction: Intravenous  Anesthetic plan and risks discussed with: Patient

## 2018-03-08 NOTE — OP NOTES
SURGICAL ARTHROSCOPY LEFT KNEE, PARTIAL MEDIAL MENISCECTOMY    Patient: Ro Naidu MRN: [id number]  [facility]: [id number]   YOB: 1955  Age: 65 y.o. Sex: male      Date of Surgery:3/8/2018      PREOPERATIVE DIAGNOSIS: Left posterior horn medial meniscal tear. POSTOPERATIVE DIAGNOSIS: Left posterior horn medial meniscal tear. PROCEDURES PERFORMED: Surgical arthroscopy, left knee, with partial medial meniscectomy. SURGEON: Giovanny Main III, MD    ANESTHESIA: MAC    COMPLICATIONS: None. TOURNIQUET TIME: None. ESTIMATED BLOOD LOSS: Minimal.    SPECIMENS REMOVED: none    INDICATIONS: A 65y.o. year-old WHITE OR  male with known left knee medial meniscal tear as documented by MRI. The patient now presents for arthroscopic treatment due to continued pain despite conservative intervention for mechanical symptoms associated with knee pain from the recent meniscal tear. DESCRIPTION OF PROCEDURE: The patient was brought to the operating theater  and after adequate anesthesia, the left knee was prepped and draped in the  typical sterile fashion. The knee was instilled with 30 mL of 0.25%  Marcaine with 1:200,000 epinephrine and 4 mg of morphine sulfate. Standard  anterolateral and anteromedial portals were anesthetized with the same  mixture without morphine. The arthroscope was placed laterally and the probe medially. Findings at this time showed that the medial compartment had grade 3 articular surfaces with a significant medial meniscus tear on probing. Using the upbiting punch, this was balanced and contoured, removing approximately 40% of the entire size of the medial meniscus. All free fragments were removed with the Incisor Plus blade. Further probing showed a nice stable meniscal rim. No chondroplasty was required of the medial femoral condyle. The ACL and PCL were normal. The lateral joint line was examined in the figure-of-four position.   The lateral compartment had grade 3 articular surfaces with a normal lateral meniscus. The patellofemoral joint was examined last in terminal extension. It showed a grade 2 trochlea and grade 3 patella. The joint was then thoroughly irrigated and decompressed. There was no evidence of synovitis and both the medial and lateral gutters were cleared  of any loose bodies. The joint space was decompressed. The  4-0 Monocryl's were used to close the portals. They were steri-stripped,  had 4 x 4's placed and an Ace wrap. The patient was then awakened from  anesthesia, and returned to the recovery room in awake, in stable  condition.  All instrument, sponge and needle counts were correct.        Nini Pickens MD  3/8/2018

## 2018-03-08 NOTE — ANESTHESIA POSTPROCEDURE EVALUATION
Post-Anesthesia Evaluation & Assessment    Visit Vitals    /62 (BP 1 Location: Left arm, BP Patient Position: At rest)    Pulse 88    Temp 37 °C (98.6 °F)    Resp 16    Ht 5' 11\" (1.803 m)    Wt 122.5 kg (270 lb 1 oz)    SpO2 97%    BMI 37.67 kg/m2       Nausea/Vomiting: no nausea    Post-operative hydration adequate. Pain score (VAS): 3    Mental status & Level of consciousness: alert and oriented x 3    Neurological status: moves all extremities, sensation grossly intact    Pulmonary status: airway patent, no supplemental oxygen required    Complications related to anesthesia: none    Patient has met all discharge requirements.     Additional comments:        Ethel Land MD

## 2018-03-08 NOTE — PERIOP NOTES
TRANSFER - IN REPORT:    Verbal report received from S. Libia Santacruz and RAFA Soler RN(name) on Fanny Awan  being received from OR(unit) for routine post - op      Report consisted of patients Situation, Background, Assessment and   Recommendations(SBAR). Information from the following report(s) SBAR, OR Summary, Procedure Summary, Intake/Output and MAR was reviewed with the receiving nurse. Opportunity for questions and clarification was provided. Assessment completed upon patients arrival to unit and care assumed.

## (undated) DEVICE — DEVON™ KNEE AND BODY STRAP 60" X 3" (1.5 M X 7.6 CM): Brand: DEVON

## (undated) DEVICE — SOLUTION IRRIG 3000ML LAC R FLX CONT

## (undated) DEVICE — TUBE IRRIG L8IN LNG PT W/ CONN FOR PMP SYS REDEUCE

## (undated) DEVICE — 4.5 MM INCISOR PLUS STRAIGHT                                    BLADES, POWER/EP-1, VIOLET, PACKAGED                                    6 PER BOX, STERILE

## (undated) DEVICE — (D)PREP SKN CHLRAPRP APPL 26ML -- CONVERT TO ITEM 371833

## (undated) DEVICE — (D)STRIP SKN CLSR 0.5X4IN WHT --

## (undated) DEVICE — STERILE POLYISOPRENE POWDER-FREE SURGICAL GLOVES WITH EMOLLIENT COATING: Brand: PROTEXIS

## (undated) DEVICE — PADDING CAST SOF-ROL 6INX4YD --

## (undated) DEVICE — STERILE POLYISOPRENE POWDER-FREE SURGICAL GLOVES: Brand: PROTEXIS

## (undated) DEVICE — SET FLD MGMT TB ARTHRO IRRIG ASPIR INFLO DISP PMP CASS

## (undated) DEVICE — NEEDLE HYPO 25GA L1.5IN BLU POLYPR HUB S STL REG BVL STR

## (undated) DEVICE — PACK PROCEDURE SURG KNEE ARTHSCP CUST

## (undated) DEVICE — SUT MONOCRYL PLUS UD 3-0 --

## (undated) DEVICE — NEEDLE HYPO 18GA L1.5IN PNK POLYPR HUB S STL THN WALL FILL

## (undated) DEVICE — SYR 50ML LR LCK 1ML GRAD NSAF --